# Patient Record
Sex: FEMALE | Race: BLACK OR AFRICAN AMERICAN | NOT HISPANIC OR LATINO | URBAN - METROPOLITAN AREA
[De-identification: names, ages, dates, MRNs, and addresses within clinical notes are randomized per-mention and may not be internally consistent; named-entity substitution may affect disease eponyms.]

---

## 2019-08-20 ENCOUNTER — EMERGENCY (EMERGENCY)
Facility: HOSPITAL | Age: 44
LOS: 1 days | Discharge: HOME | End: 2019-08-20
Attending: EMERGENCY MEDICINE | Admitting: EMERGENCY MEDICINE
Payer: SELF-PAY

## 2019-08-20 VITALS
DIASTOLIC BLOOD PRESSURE: 90 MMHG | TEMPERATURE: 98 F | SYSTOLIC BLOOD PRESSURE: 152 MMHG | OXYGEN SATURATION: 100 % | RESPIRATION RATE: 18 BRPM | HEART RATE: 100 BPM

## 2019-08-20 VITALS — DIASTOLIC BLOOD PRESSURE: 85 MMHG | SYSTOLIC BLOOD PRESSURE: 157 MMHG | HEART RATE: 89 BPM | TEMPERATURE: 98 F

## 2019-08-20 DIAGNOSIS — F10.920 ALCOHOL USE, UNSPECIFIED WITH INTOXICATION, UNCOMPLICATED: ICD-10-CM

## 2019-08-20 DIAGNOSIS — F43.20 ADJUSTMENT DISORDER, UNSPECIFIED: ICD-10-CM

## 2019-08-20 LAB
ALBUMIN SERPL ELPH-MCNC: 4.6 G/DL — SIGNIFICANT CHANGE UP (ref 3.3–5)
ALP SERPL-CCNC: 163 U/L — HIGH (ref 40–120)
ALT FLD-CCNC: 20 U/L — SIGNIFICANT CHANGE UP (ref 4–33)
AMPHET UR-MCNC: NEGATIVE — SIGNIFICANT CHANGE UP
ANION GAP SERPL CALC-SCNC: 20 MMO/L — HIGH (ref 7–14)
APAP SERPL-MCNC: < 15 UG/ML — LOW (ref 15–25)
APPEARANCE UR: CLEAR — SIGNIFICANT CHANGE UP
AST SERPL-CCNC: 17 U/L — SIGNIFICANT CHANGE UP (ref 4–32)
B-OH-BUTYR SERPL-SCNC: 0.3 MMOL/L — SIGNIFICANT CHANGE UP (ref 0–0.4)
BACTERIA # UR AUTO: NEGATIVE — SIGNIFICANT CHANGE UP
BARBITURATES UR SCN-MCNC: NEGATIVE — SIGNIFICANT CHANGE UP
BASE EXCESS BLDV CALC-SCNC: -1.8 MMOL/L — SIGNIFICANT CHANGE UP
BASE EXCESS BLDV CALC-SCNC: -2.7 MMOL/L — SIGNIFICANT CHANGE UP
BASE EXCESS BLDV CALC-SCNC: -3.9 MMOL/L — SIGNIFICANT CHANGE UP
BASE EXCESS BLDV CALC-SCNC: -6.7 MMOL/L — SIGNIFICANT CHANGE UP
BASOPHILS # BLD AUTO: 0.01 K/UL — SIGNIFICANT CHANGE UP (ref 0–0.2)
BASOPHILS NFR BLD AUTO: 0.2 % — SIGNIFICANT CHANGE UP (ref 0–2)
BENZODIAZ UR-MCNC: NEGATIVE — SIGNIFICANT CHANGE UP
BILIRUB SERPL-MCNC: < 0.2 MG/DL — LOW (ref 0.2–1.2)
BILIRUB UR-MCNC: NEGATIVE — SIGNIFICANT CHANGE UP
BLOOD GAS VENOUS - CREATININE: 0.48 MG/DL — LOW (ref 0.5–1.3)
BLOOD GAS VENOUS - CREATININE: 0.55 MG/DL — SIGNIFICANT CHANGE UP (ref 0.5–1.3)
BLOOD GAS VENOUS - CREATININE: 0.65 MG/DL — SIGNIFICANT CHANGE UP (ref 0.5–1.3)
BLOOD GAS VENOUS - CREATININE: SIGNIFICANT CHANGE UP MG/DL (ref 0.5–1.3)
BLOOD GAS VENOUS - FIO2: 21 — SIGNIFICANT CHANGE UP
BLOOD UR QL VISUAL: SIGNIFICANT CHANGE UP
BUN SERPL-MCNC: 16 MG/DL — SIGNIFICANT CHANGE UP (ref 7–23)
CALCIUM SERPL-MCNC: 9.5 MG/DL — SIGNIFICANT CHANGE UP (ref 8.4–10.5)
CANNABINOIDS UR-MCNC: NEGATIVE — SIGNIFICANT CHANGE UP
CHLORIDE BLDV-SCNC: 107 MMOL/L — SIGNIFICANT CHANGE UP (ref 96–108)
CHLORIDE BLDV-SCNC: 111 MMOL/L — HIGH (ref 96–108)
CHLORIDE BLDV-SCNC: 113 MMOL/L — HIGH (ref 96–108)
CHLORIDE BLDV-SCNC: 99 MMOL/L — SIGNIFICANT CHANGE UP (ref 96–108)
CHLORIDE SERPL-SCNC: 93 MMOL/L — LOW (ref 98–107)
CO2 SERPL-SCNC: 20 MMOL/L — LOW (ref 22–31)
COCAINE METAB.OTHER UR-MCNC: NEGATIVE — SIGNIFICANT CHANGE UP
COLOR SPEC: COLORLESS — SIGNIFICANT CHANGE UP
CREAT SERPL-MCNC: 0.56 MG/DL — SIGNIFICANT CHANGE UP (ref 0.5–1.3)
EOSINOPHIL # BLD AUTO: 0.06 K/UL — SIGNIFICANT CHANGE UP (ref 0–0.5)
EOSINOPHIL NFR BLD AUTO: 1.1 % — SIGNIFICANT CHANGE UP (ref 0–6)
ETHANOL BLD-MCNC: 147 MG/DL — HIGH
GAS PNL BLDV: 133 MMOL/L — LOW (ref 136–146)
GAS PNL BLDV: 134 MMOL/L — LOW (ref 136–146)
GAS PNL BLDV: 134 MMOL/L — LOW (ref 136–146)
GAS PNL BLDV: 136 MMOL/L — SIGNIFICANT CHANGE UP (ref 136–146)
GLUCOSE BLDV-MCNC: 132 MG/DL — HIGH (ref 70–99)
GLUCOSE BLDV-MCNC: 146 MG/DL — HIGH (ref 70–99)
GLUCOSE BLDV-MCNC: 275 MG/DL — HIGH (ref 70–99)
GLUCOSE BLDV-MCNC: 487 MG/DL — CRITICAL HIGH (ref 70–99)
GLUCOSE SERPL-MCNC: 520 MG/DL — CRITICAL HIGH (ref 70–99)
GLUCOSE UR-MCNC: >1000 — HIGH
HCG SERPL-ACNC: < 5 MIU/ML — SIGNIFICANT CHANGE UP
HCO3 BLDV-SCNC: 19 MMOL/L — LOW (ref 20–27)
HCO3 BLDV-SCNC: 21 MMOL/L — SIGNIFICANT CHANGE UP (ref 20–27)
HCO3 BLDV-SCNC: 22 MMOL/L — SIGNIFICANT CHANGE UP (ref 20–27)
HCO3 BLDV-SCNC: 23 MMOL/L — SIGNIFICANT CHANGE UP (ref 20–27)
HCT VFR BLD CALC: 41.5 % — SIGNIFICANT CHANGE UP (ref 34.5–45)
HCT VFR BLDV CALC: 32.3 % — LOW (ref 34.5–45)
HCT VFR BLDV CALC: 33.1 % — LOW (ref 34.5–45)
HCT VFR BLDV CALC: 34.1 % — LOW (ref 34.5–45)
HCT VFR BLDV CALC: 40.1 % — SIGNIFICANT CHANGE UP (ref 34.5–45)
HGB BLD-MCNC: 12.7 G/DL — SIGNIFICANT CHANGE UP (ref 11.5–15.5)
HGB BLDV-MCNC: 10.5 G/DL — LOW (ref 11.5–15.5)
HGB BLDV-MCNC: 10.7 G/DL — LOW (ref 11.5–15.5)
HGB BLDV-MCNC: 11 G/DL — LOW (ref 11.5–15.5)
HGB BLDV-MCNC: 13 G/DL — SIGNIFICANT CHANGE UP (ref 11.5–15.5)
HYALINE CASTS # UR AUTO: NEGATIVE — SIGNIFICANT CHANGE UP
IMM GRANULOCYTES NFR BLD AUTO: 0.2 % — SIGNIFICANT CHANGE UP (ref 0–1.5)
KETONES UR-MCNC: SIGNIFICANT CHANGE UP
LACTATE BLDV-MCNC: 1.5 MMOL/L — SIGNIFICANT CHANGE UP (ref 0.5–2)
LACTATE BLDV-MCNC: 2.6 MMOL/L — HIGH (ref 0.5–2)
LACTATE BLDV-MCNC: 4 MMOL/L — CRITICAL HIGH (ref 0.5–2)
LACTATE BLDV-MCNC: 4.2 MMOL/L — CRITICAL HIGH (ref 0.5–2)
LEUKOCYTE ESTERASE UR-ACNC: NEGATIVE — SIGNIFICANT CHANGE UP
LYMPHOCYTES # BLD AUTO: 2.49 K/UL — SIGNIFICANT CHANGE UP (ref 1–3.3)
LYMPHOCYTES # BLD AUTO: 44.9 % — HIGH (ref 13–44)
MCHC RBC-ENTMCNC: 27.7 PG — SIGNIFICANT CHANGE UP (ref 27–34)
MCHC RBC-ENTMCNC: 30.6 % — LOW (ref 32–36)
MCV RBC AUTO: 90.6 FL — SIGNIFICANT CHANGE UP (ref 80–100)
METHADONE UR-MCNC: NEGATIVE — SIGNIFICANT CHANGE UP
MONOCYTES # BLD AUTO: 0.3 K/UL — SIGNIFICANT CHANGE UP (ref 0–0.9)
MONOCYTES NFR BLD AUTO: 5.4 % — SIGNIFICANT CHANGE UP (ref 2–14)
NEUTROPHILS # BLD AUTO: 2.67 K/UL — SIGNIFICANT CHANGE UP (ref 1.8–7.4)
NEUTROPHILS NFR BLD AUTO: 48.2 % — SIGNIFICANT CHANGE UP (ref 43–77)
NITRITE UR-MCNC: NEGATIVE — SIGNIFICANT CHANGE UP
NRBC # FLD: 0 K/UL — SIGNIFICANT CHANGE UP (ref 0–0)
OPIATES UR-MCNC: NEGATIVE — SIGNIFICANT CHANGE UP
OXYCODONE UR-MCNC: NEGATIVE — SIGNIFICANT CHANGE UP
PCO2 BLDV: 37 MMHG — LOW (ref 41–51)
PCO2 BLDV: 38 MMHG — LOW (ref 41–51)
PCO2 BLDV: 38 MMHG — LOW (ref 41–51)
PCO2 BLDV: 45 MMHG — SIGNIFICANT CHANGE UP (ref 41–51)
PCP UR-MCNC: NEGATIVE — SIGNIFICANT CHANGE UP
PH BLDV: 7.32 PH — SIGNIFICANT CHANGE UP (ref 7.32–7.43)
PH BLDV: 7.32 PH — SIGNIFICANT CHANGE UP (ref 7.32–7.43)
PH BLDV: 7.36 PH — SIGNIFICANT CHANGE UP (ref 7.32–7.43)
PH BLDV: 7.39 PH — SIGNIFICANT CHANGE UP (ref 7.32–7.43)
PH UR: 6 — SIGNIFICANT CHANGE UP (ref 5–8)
PLATELET # BLD AUTO: 256 K/UL — SIGNIFICANT CHANGE UP (ref 150–400)
PMV BLD: 12.1 FL — SIGNIFICANT CHANGE UP (ref 7–13)
PO2 BLDV: 147 MMHG — HIGH (ref 35–40)
PO2 BLDV: 54 MMHG — HIGH (ref 35–40)
PO2 BLDV: 61 MMHG — HIGH (ref 35–40)
PO2 BLDV: 81 MMHG — HIGH (ref 35–40)
POTASSIUM BLDV-SCNC: 3.6 MMOL/L — SIGNIFICANT CHANGE UP (ref 3.4–4.5)
POTASSIUM BLDV-SCNC: 3.6 MMOL/L — SIGNIFICANT CHANGE UP (ref 3.4–4.5)
POTASSIUM BLDV-SCNC: 4 MMOL/L — SIGNIFICANT CHANGE UP (ref 3.4–4.5)
POTASSIUM BLDV-SCNC: 4.6 MMOL/L — HIGH (ref 3.4–4.5)
POTASSIUM SERPL-MCNC: 4 MMOL/L — SIGNIFICANT CHANGE UP (ref 3.5–5.3)
POTASSIUM SERPL-SCNC: 4 MMOL/L — SIGNIFICANT CHANGE UP (ref 3.5–5.3)
PROT SERPL-MCNC: 7.8 G/DL — SIGNIFICANT CHANGE UP (ref 6–8.3)
PROT UR-MCNC: NEGATIVE — SIGNIFICANT CHANGE UP
RBC # BLD: 4.58 M/UL — SIGNIFICANT CHANGE UP (ref 3.8–5.2)
RBC # FLD: 12.7 % — SIGNIFICANT CHANGE UP (ref 10.3–14.5)
RBC CASTS # UR COMP ASSIST: HIGH (ref 0–?)
SALICYLATES SERPL-MCNC: < 5 MG/DL — LOW (ref 15–30)
SAO2 % BLDV: 80.8 % — SIGNIFICANT CHANGE UP (ref 60–85)
SAO2 % BLDV: 92.1 % — HIGH (ref 60–85)
SAO2 % BLDV: 94.9 % — HIGH (ref 60–85)
SAO2 % BLDV: 99 % — HIGH (ref 60–85)
SODIUM SERPL-SCNC: 133 MMOL/L — LOW (ref 135–145)
SP GR SPEC: 1.03 — SIGNIFICANT CHANGE UP (ref 1–1.04)
SQUAMOUS # UR AUTO: SIGNIFICANT CHANGE UP
UROBILINOGEN FLD QL: NORMAL — SIGNIFICANT CHANGE UP
WBC # BLD: 5.54 K/UL — SIGNIFICANT CHANGE UP (ref 3.8–10.5)
WBC # FLD AUTO: 5.54 K/UL — SIGNIFICANT CHANGE UP (ref 3.8–10.5)
WBC UR QL: SIGNIFICANT CHANGE UP (ref 0–?)

## 2019-08-20 PROCEDURE — 99053 MED SERV 10PM-8AM 24 HR FAC: CPT

## 2019-08-20 PROCEDURE — 90792 PSYCH DIAG EVAL W/MED SRVCS: CPT

## 2019-08-20 PROCEDURE — 93010 ELECTROCARDIOGRAM REPORT: CPT

## 2019-08-20 PROCEDURE — 99285 EMERGENCY DEPT VISIT HI MDM: CPT | Mod: 25

## 2019-08-20 RX ORDER — LORATADINE 10 MG/1
10 TABLET ORAL DAILY
Refills: 0 | Status: DISCONTINUED | OUTPATIENT
Start: 2019-08-20 | End: 2019-08-31

## 2019-08-20 RX ORDER — SODIUM CHLORIDE 9 MG/ML
1000 INJECTION INTRAMUSCULAR; INTRAVENOUS; SUBCUTANEOUS ONCE
Refills: 0 | Status: COMPLETED | OUTPATIENT
Start: 2019-08-20 | End: 2019-08-20

## 2019-08-20 RX ORDER — SODIUM CHLORIDE 9 MG/ML
2000 INJECTION INTRAMUSCULAR; INTRAVENOUS; SUBCUTANEOUS ONCE
Refills: 0 | Status: COMPLETED | OUTPATIENT
Start: 2019-08-20 | End: 2019-08-20

## 2019-08-20 RX ORDER — LORATADINE 10 MG/1
10 TABLET ORAL ONCE
Refills: 0 | Status: DISCONTINUED | OUTPATIENT
Start: 2019-08-20 | End: 2019-08-20

## 2019-08-20 RX ORDER — ACETAMINOPHEN 500 MG
650 TABLET ORAL ONCE
Refills: 0 | Status: COMPLETED | OUTPATIENT
Start: 2019-08-20 | End: 2019-08-20

## 2019-08-20 RX ORDER — ACETAMINOPHEN 500 MG
975 TABLET ORAL ONCE
Refills: 0 | Status: COMPLETED | OUTPATIENT
Start: 2019-08-20 | End: 2019-08-20

## 2019-08-20 RX ORDER — INSULIN LISPRO 100/ML
10 VIAL (ML) SUBCUTANEOUS ONCE
Refills: 0 | Status: COMPLETED | OUTPATIENT
Start: 2019-08-20 | End: 2019-08-20

## 2019-08-20 RX ADMIN — Medication 975 MILLIGRAM(S): at 16:36

## 2019-08-20 RX ADMIN — Medication 10 UNIT(S): at 08:50

## 2019-08-20 RX ADMIN — Medication 650 MILLIGRAM(S): at 11:15

## 2019-08-20 RX ADMIN — LORATADINE 10 MILLIGRAM(S): 10 TABLET ORAL at 13:26

## 2019-08-20 RX ADMIN — SODIUM CHLORIDE 2000 MILLILITER(S): 9 INJECTION INTRAMUSCULAR; INTRAVENOUS; SUBCUTANEOUS at 08:50

## 2019-08-20 RX ADMIN — SODIUM CHLORIDE 1000 MILLILITER(S): 9 INJECTION INTRAMUSCULAR; INTRAVENOUS; SUBCUTANEOUS at 11:14

## 2019-08-20 RX ADMIN — Medication 650 MILLIGRAM(S): at 08:59

## 2019-08-20 NOTE — ED ADULT NURSE NOTE - NSIMPLEMENTINTERV_GEN_ALL_ED
Implemented All Universal Safety Interventions:  [place] to call system. Call bell, personal items and telephone within reach. Instruct patient to call for assistance. Room bathroom lighting operational. Non-slip footwear when patient is off stretcher. Physically safe environment: no spills, clutter or unnecessary equipment. Stretcher in lowest position, wheels locked, appropriate side rails in place.

## 2019-08-20 NOTE — ED BEHAVIORAL HEALTH ASSESSMENT NOTE - DESCRIPTION
single, previously ; does hair at times for work; resides with daughter and dtr-in-law diabetes Vital Signs Last 24 Hrs  T(C): 36.7 (20 Aug 2019 17:19), Max: 36.9 (20 Aug 2019 15:21)  T(F): 98 (20 Aug 2019 17:19), Max: 98.4 (20 Aug 2019 15:21)  HR: 89 (20 Aug 2019 17:19) (89 - 100)  BP: 157/85 (20 Aug 2019 17:19) (124/68 - 157/85)  BP(mean): --  RR: 17 (20 Aug 2019 15:21) (17 - 18)  SpO2: 100% (20 Aug 2019 15:21) (100% - 100%)
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
## 2019-08-20 NOTE — ED PROVIDER NOTE - PROGRESS NOTE DETAILS
[place] PGY2- psych called, will see once pt is medically clear,   2nd lactate still high, will continue hydration, no infectious sx, UA neg, no signs of DKA  BG better, low 200s Addi PGY2- daughter, lives with pt, contact at  if needed [place] PGY2- lactate normalized after hydration, food, last BG stable, psyche repaged  medically clear [place] PGY2- cleared by psych, stable for d/c, psych crisis info given results d/w patient and copies given. counseled on importance of adhering to dm regimen and risks to her health if she does not do so. Instructed to fu psych and return precautions discussed. patient ao3, cooperative, demonstrates understanding of these instructions.

## 2019-08-20 NOTE — ED PROVIDER NOTE - PHYSICAL EXAMINATION
GEN - NAD; well appearing; A+O x3   HEAD - NC/AT   EYES- PERRL, EOMI  ENT: Airway patent, mmm, Oral cavity and pharynx normal.   NECK: Neck supple  PULMONARY - CTA b/l, symmetric breath sounds.   CARDIAC -s1s2, RRR, no M,G,R  ABDOMEN - +BS, ND, NT, soft, no guarding, no rebound, no masses   BACK - no CVA tenderness, Normal  spine   EXTREMITIES - FROM, no edema   SKIN - no rash or bruising   NEUROLOGIC - alert, speech clear, no focal deficits  PSYCH -nl mood/affect, nl insight. GEN - NAD; well appearing; A+O x3   HEAD - NC/AT   EYES- PERRL, EOMI  ENT: Airway patent, mmm, Oral cavity and pharynx normal.   NECK: Neck supple  PULMONARY - CTA b/l, symmetric breath sounds.   CARDIAC -s1s2, RRR, no M,G,R  ABDOMEN - +BS, ND, NT, soft, no guarding, no rebound, no masses   BACK - no CVA tenderness, Normal  spine   EXTREMITIES - FROM, no edema   SKIN - no rash or bruising   NEUROLOGIC - alert, speech clear, no focal deficits  PSYCH -cooperative, appears upset
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
## 2019-08-20 NOTE — ED BEHAVIORAL HEALTH ASSESSMENT NOTE - SUMMARY
Pt is a 43yo single female, with no prior psychiatric history, who made SI/HI statement earlier this am at home during argument with adult dtr. Pt was under the influence of alcohol at the time. She reports that she did not mean the statements, denies any intent/plan to harm self or others, has no h/o violence, and feels she made remarks out of anger. Pt is calm, feels safe going home, has no further issues with her dtr. Dtr also feels comfortable with pt returning home and also feels statements were made b/c the pt was under the influence of alcohol. Pt does not require inpt hospitalization for statements made out of anger and under the influence of alcohol. Currently sober, pt denies any Si/HI, or psychiatric sxs. She is cleared and can f/u outpt if she desires. Incident appears to be one time, not ongoing or chronic.

## 2019-08-20 NOTE — ED PROVIDER NOTE - NS ED ROS FT

## 2019-08-20 NOTE — ED PROVIDER NOTE - NSFOLLOWUPINSTRUCTIONS_ED_ALL_ED_FT
Please follow up with a psychiatrist.     Attached is a list of contacts.    Do not hesitate to call 911 or return to ER if you feel you need help.    Take all your medications are prescribed and try not to drink.

## 2019-08-20 NOTE — ED BEHAVIORAL HEALTH ASSESSMENT NOTE - RISK ASSESSMENT
Low risk of harm to self/others as pt has no prior psychiatric history, no h/o violence, self harm or SI; no amira/depressive/psychotic sxs impairing behavior or insight; pts risk factor is that she may drink to intoxication and have impulse control issues during those times.   Safety factors are pt's family, linear thinking, and work.  C-SSRS Screener     1. Have you ever wished to be dead or wished you could go to sleep and not wake up?  [  ]Yes, [ x ]No, [  ]Unable to Assess  Details _____________________________     2. Have you actually had any thoughts of killing yourself?   [  ]Yes, [ x ]No, [  ]Unable to Assess  Details _____________________________     If answer is “No” for 1 and 2, stop here.     Additional Suicide Risk Factors (select all that apply)  [  ]Access to lethal means including firearms  [  ]Family history of suicide  [  ]Impulsivity  [  ] Current or past mood disorder  [  ] Current or past psychotic disorder  [  ] Current or past PTSD  [  ] Current or past ADHD  [  ] Current or past TBI  [  ] Current or past cluster B personality disorder or traits  [  ] Current or past conduct problems  [  ] Recent onset of current or past psychiatric disorder  [  ] Family history of psychiatric diagnoses requiring hospitalization     Additional Activating Events (select all that apply)  [  ]Perceived burden on family or others  [  ]Current sexual or physical abuse  [  x]Substance intoxication or withdrawal  [  ]Inadequate social supports  [  ]Hopeless about or dissatisfied with current provider or treatment     Additional Protective Factors (select all that apply)  [  ] Future plans  [  ] Voodoo beliefs  [  ] Beloved pets
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
## 2019-08-20 NOTE — ED BEHAVIORAL HEALTH ASSESSMENT NOTE - SAFETY PLAN DETAILS
return to ED, call 911 if sxs recur; pt at low risk, no prior incidents; had argument w/dtr which escalated
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
## 2019-08-20 NOTE — ED PROVIDER NOTE - SEVERE SEPSIS ALERT DETAILS
lactate likely elevated 2/2 dehydration in setting of hyperglycemia, no infectious symptoms, h/p not consistent with sepsis
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
## 2024-04-24 ENCOUNTER — EMERGENCY (EMERGENCY)
Facility: HOSPITAL | Age: 49
LOS: 0 days | Discharge: HOME | End: 2024-04-24
Attending: STUDENT IN AN ORGANIZED HEALTH CARE EDUCATION/TRAINING PROGRAM
Payer: COMMERCIAL

## 2024-04-24 VITALS
WEIGHT: 173.94 LBS | HEART RATE: 138 BPM | HEIGHT: 62 IN | RESPIRATION RATE: 17 BRPM | OXYGEN SATURATION: 100 % | DIASTOLIC BLOOD PRESSURE: 100 MMHG | SYSTOLIC BLOOD PRESSURE: 135 MMHG | TEMPERATURE: 98 F

## 2024-04-24 VITALS
DIASTOLIC BLOOD PRESSURE: 71 MMHG | RESPIRATION RATE: 12 BRPM | HEART RATE: 90 BPM | OXYGEN SATURATION: 100 % | SYSTOLIC BLOOD PRESSURE: 147 MMHG | TEMPERATURE: 98 F

## 2024-04-24 DIAGNOSIS — I48.91 UNSPECIFIED ATRIAL FIBRILLATION: ICD-10-CM

## 2024-04-24 DIAGNOSIS — R00.2 PALPITATIONS: ICD-10-CM

## 2024-04-24 DIAGNOSIS — Z79.4 LONG TERM (CURRENT) USE OF INSULIN: ICD-10-CM

## 2024-04-24 DIAGNOSIS — E11.65 TYPE 2 DIABETES MELLITUS WITH HYPERGLYCEMIA: ICD-10-CM

## 2024-04-24 DIAGNOSIS — R00.0 TACHYCARDIA, UNSPECIFIED: ICD-10-CM

## 2024-04-24 PROBLEM — E11.9 TYPE 2 DIABETES MELLITUS WITHOUT COMPLICATIONS: Chronic | Status: ACTIVE | Noted: 2019-08-20

## 2024-04-24 PROBLEM — O00.90 UNSPECIFIED ECTOPIC PREGNANCY WITHOUT INTRAUTERINE PREGNANCY: Chronic | Status: ACTIVE | Noted: 2019-08-20

## 2024-04-24 LAB
ALBUMIN SERPL ELPH-MCNC: 3.3 G/DL — SIGNIFICANT CHANGE UP (ref 3.3–5)
ALP SERPL-CCNC: 262 U/L — HIGH (ref 40–120)
ALT FLD-CCNC: 35 U/L — SIGNIFICANT CHANGE UP (ref 12–78)
ANION GAP SERPL CALC-SCNC: 13 MMOL/L — SIGNIFICANT CHANGE UP (ref 5–17)
AST SERPL-CCNC: 24 U/L — SIGNIFICANT CHANGE UP (ref 15–37)
BASOPHILS # BLD AUTO: 0.02 K/UL — SIGNIFICANT CHANGE UP (ref 0–0.2)
BASOPHILS NFR BLD AUTO: 0.5 % — SIGNIFICANT CHANGE UP (ref 0–2)
BILIRUB SERPL-MCNC: 0.2 MG/DL — SIGNIFICANT CHANGE UP (ref 0.2–1.2)
BUN SERPL-MCNC: 25 MG/DL — HIGH (ref 7–23)
CALCIUM SERPL-MCNC: 9.1 MG/DL — SIGNIFICANT CHANGE UP (ref 8.5–10.1)
CHLORIDE SERPL-SCNC: 98 MMOL/L — SIGNIFICANT CHANGE UP (ref 96–108)
CO2 SERPL-SCNC: 23 MMOL/L — SIGNIFICANT CHANGE UP (ref 22–31)
CREAT SERPL-MCNC: 1.3 MG/DL — SIGNIFICANT CHANGE UP (ref 0.5–1.3)
EGFR: 50 ML/MIN/1.73M2 — LOW
EOSINOPHIL # BLD AUTO: 0.02 K/UL — SIGNIFICANT CHANGE UP (ref 0–0.5)
EOSINOPHIL NFR BLD AUTO: 0.5 % — SIGNIFICANT CHANGE UP (ref 0–6)
GLUCOSE SERPL-MCNC: 583 MG/DL — CRITICAL HIGH (ref 70–99)
HCG SERPL-ACNC: 8 MIU/ML — SIGNIFICANT CHANGE UP
HCT VFR BLD CALC: 36.9 % — SIGNIFICANT CHANGE UP (ref 34.5–45)
HGB BLD-MCNC: 11.8 G/DL — SIGNIFICANT CHANGE UP (ref 11.5–15.5)
IMM GRANULOCYTES NFR BLD AUTO: 0.2 % — SIGNIFICANT CHANGE UP (ref 0–0.9)
LYMPHOCYTES # BLD AUTO: 2.1 K/UL — SIGNIFICANT CHANGE UP (ref 1–3.3)
LYMPHOCYTES # BLD AUTO: 49 % — HIGH (ref 13–44)
MAGNESIUM SERPL-MCNC: 2.1 MG/DL — SIGNIFICANT CHANGE UP (ref 1.6–2.6)
MCHC RBC-ENTMCNC: 27.6 PG — SIGNIFICANT CHANGE UP (ref 27–34)
MCHC RBC-ENTMCNC: 32 G/DL — SIGNIFICANT CHANGE UP (ref 32–36)
MCV RBC AUTO: 86.4 FL — SIGNIFICANT CHANGE UP (ref 80–100)
MONOCYTES # BLD AUTO: 0.22 K/UL — SIGNIFICANT CHANGE UP (ref 0–0.9)
MONOCYTES NFR BLD AUTO: 5.1 % — SIGNIFICANT CHANGE UP (ref 2–14)
NEUTROPHILS # BLD AUTO: 1.92 K/UL — SIGNIFICANT CHANGE UP (ref 1.8–7.4)
NEUTROPHILS NFR BLD AUTO: 44.7 % — SIGNIFICANT CHANGE UP (ref 43–77)
NRBC # BLD: 0 /100 WBCS — SIGNIFICANT CHANGE UP (ref 0–0)
PHOSPHATE SERPL-MCNC: 3.2 MG/DL — SIGNIFICANT CHANGE UP (ref 2.5–4.5)
PLATELET # BLD AUTO: 208 K/UL — SIGNIFICANT CHANGE UP (ref 150–400)
POTASSIUM SERPL-MCNC: 4.6 MMOL/L — SIGNIFICANT CHANGE UP (ref 3.5–5.3)
POTASSIUM SERPL-SCNC: 4.6 MMOL/L — SIGNIFICANT CHANGE UP (ref 3.5–5.3)
PROT SERPL-MCNC: 7.7 GM/DL — SIGNIFICANT CHANGE UP (ref 6–8.3)
RBC # BLD: 4.27 M/UL — SIGNIFICANT CHANGE UP (ref 3.8–5.2)
RBC # FLD: 13.3 % — SIGNIFICANT CHANGE UP (ref 10.3–14.5)
SODIUM SERPL-SCNC: 134 MMOL/L — LOW (ref 135–145)
TROPONIN I, HIGH SENSITIVITY RESULT: 20.3 NG/L — SIGNIFICANT CHANGE UP
WBC # BLD: 4.29 K/UL — SIGNIFICANT CHANGE UP (ref 3.8–10.5)
WBC # FLD AUTO: 4.29 K/UL — SIGNIFICANT CHANGE UP (ref 3.8–10.5)

## 2024-04-24 PROCEDURE — 93010 ELECTROCARDIOGRAM REPORT: CPT

## 2024-04-24 PROCEDURE — 99285 EMERGENCY DEPT VISIT HI MDM: CPT

## 2024-04-24 RX ORDER — INSULIN HUMAN 100 [IU]/ML
10 INJECTION, SOLUTION SUBCUTANEOUS ONCE
Refills: 0 | Status: COMPLETED | OUTPATIENT
Start: 2024-04-24 | End: 2024-04-24

## 2024-04-24 RX ORDER — SODIUM CHLORIDE 9 MG/ML
1000 INJECTION INTRAMUSCULAR; INTRAVENOUS; SUBCUTANEOUS ONCE
Refills: 0 | Status: COMPLETED | OUTPATIENT
Start: 2024-04-24 | End: 2024-04-24

## 2024-04-24 RX ORDER — ASPIRIN/CALCIUM CARB/MAGNESIUM 324 MG
1 TABLET ORAL
Qty: 15 | Refills: 0
Start: 2024-04-24 | End: 2024-05-08

## 2024-04-24 RX ADMIN — INSULIN HUMAN 10 UNIT(S): 100 INJECTION, SOLUTION SUBCUTANEOUS at 04:34

## 2024-04-24 RX ADMIN — SODIUM CHLORIDE 1000 MILLILITER(S): 9 INJECTION INTRAMUSCULAR; INTRAVENOUS; SUBCUTANEOUS at 03:49

## 2024-04-24 RX ADMIN — SODIUM CHLORIDE 2000 MILLILITER(S): 9 INJECTION INTRAMUSCULAR; INTRAVENOUS; SUBCUTANEOUS at 03:49

## 2024-04-24 NOTE — ED PROVIDER NOTE - PATIENT PORTAL LINK FT
You can access the FollowMyHealth Patient Portal offered by [facility] by registering at the following website: [email or web address]. By joining [facility]’s FollowMyHealth portal, you will also be able to view your health information using other applications (apps) compatible with our system.

## 2024-04-24 NOTE — ED ADULT NURSE NOTE - CHIEF COMPLAINT QUOTE
BIBA " my sugar is high" c/o palpitations taking Levemir , insulin was just changed to  Lantus and Victoza but hasn't start taking it yet,  fs 536 normal saline 250ml given by EMS , never ICU, denies pain.

## 2024-04-24 NOTE — ED ADULT NURSE NOTE - OBJECTIVE STATEMENT
pt a&ox4, ambulatory with steady gait. pt reports of palpitations that started at 1am this morning. pt reports lightheadedness, dizziness, blurry vision, and SOB. pt presents tachycardic on arrival on monitor. EMS IV R AC intact. Reports insulin was taking earlier in the day, has a new Rx for Lantus but has not picked it up yet. Pt denies NVD, chest pain. PMH - DM, HTN. Denies PSH.

## 2024-04-24 NOTE — ED PROVIDER NOTE - NSFOLLOWUPINSTRUCTIONS_ED_ALL_ED_FT
Hyperglycemia    Hyperglycemia occurs when the glucose (sugar) level in your blood is too high. Symptoms include increased urination, increased thirst, a dry mouth, or changes in appetite. If started on a medication, take exactly as prescribed by your health care professional. Maintain a healthy lifestyle and follow up with your primary care physician.    SEEK IMMEDIATE MEDICAL CARE IF YOU HAVE ANY OF THE FOLLOWING SYMPTOMS: shortness of breath, change in mental status, nausea or vomiting, fruity smell to your breath, or any signs of dehydration.     Atrial Fibrillation    Atrial fibrillation is a type of irregular heartbeat (arrhythmia) where the heart quivers continuously in a chaotic pattern that makes the heart unable to pump blood normally. This can increase the risk for stroke, heart failure, and other heart-related conditions. Atrial fibrillation can be caused by a variety of conditions and may be temporary, intermittent, or permanent. Symptoms include feeling that your heart is beating rapidly or irregularly, chest discomfort, shortness of breath, or dizziness/lightheadedness that may be worse with exertion. Treatment is varied but may involve medication or electrical shock (cardioversion).    SEEK IMMEDIATE MEDICAL CARE IF YOU HAVE ANY OF THE FOLLOWING SYMPTOMS: chest pain, shortness of breath, abdominal pain, sweating, vomiting, blood in vomit/bowel movements/urine, dizziness/lightheadedness, weakness or numbness to face/arm/leg, trouble speaking or understanding, facial droop.

## 2024-04-24 NOTE — ED ADULT TRIAGE NOTE - CHIEF COMPLAINT QUOTE
BIBA " my sugar is high" c/o palpitations taking Levemir , insulin was just changed to  Lantus ,   Victoza but have not staring taking it yet fs by 536 normal saline 250ml given by EMS , never ICU, denies pain. BIBA " my sugar is high" c/o palpitations taking Levemir , insulin was just changed to  Lantus and Victoza but hasn't start taking it yet,  fs 536 normal saline 250ml given by EMS , never ICU, denies pain.

## 2024-04-24 NOTE — ED PROVIDER NOTE - CARE PROVIDER_API CALL
Neel Real  Cardiology  [address] [place] [address]  Phone: [phone]  Fax: [phone]  Established Patient  Follow Up Time: Urgent

## 2024-04-24 NOTE — ED ADULT NURSE NOTE - NSICDXPASTMEDICALHX_GEN_ALL_CORE_FT
PAST MEDICAL HISTORY:  Diabetes     Diabetes mellitus     DM (diabetes mellitus)     Ectopic pregnancy     No pertinent past medical history

## 2024-04-24 NOTE — ED PROVIDER NOTE - NSFOLLOWUPCLINICS_GEN_ALL_ED_FT
Cardiology at [facility]  Cardiology  [address] [place] [id number]  Phone: [phone]  Fax:     Cardiology at [facility]  Cardiology  [address] [place] [address]  Phone: [phone]  Fax:

## 2024-04-24 NOTE — ED PROVIDER NOTE - CLINICAL SUMMARY MEDICAL DECISION MAKING FREE TEXT BOX
49-year-old female with past medical history of diabetes on insulin presenting with palpitations for 1 day.  Patient states she had palpitations lasting for several minutes, palpitations have been going on intermittently.  Patient states she has had these episodes of palpitations in the past but was never evaluated for it.  Patient states she has not taken her insulin over the last 24 hours, believes this is the reason why her blood sugar is high.  Denies chest pain shortness of breath abdominal pain nausea vomiting fever cough chills headache.    Patient initially here tachycardic to 130s in A-fib with RVR, otherwise hemodynamically stable.  Fingerstick here in mid 500s.    Workup initiated to rule out DKA, given 2 L IV fluids for hyperglycemia.  No DKA on workup, A-fib resolved after 500 cc fluids.  A-fib likely secondary to hyperglycemia versus dehydration, will defer treatment of A-fib for now and have patient follow-up with cardiologist.  Patient placed in observation here for 3 hours with no repeat episodes of A-fib, resolved to sinus rhythm in 80s soon after fluids given.  Patient given 10 units of insulin, blood sugar normalizing.  Will have patient follow-up with cardiology for paroxysmal A-fib and endocrinology for blood sugar management 49-year-old female with past medical history of diabetes on insulin presenting with palpitations for 1 day.  Patient states she had palpitations lasting for several minutes, palpitations have been going on intermittently.  Patient states she has had these episodes of palpitations in the past but was never evaluated for it.  Patient states she has not taken her insulin over the last 24 hours, believes this is the reason why her blood sugar is high.  Denies chest pain shortness of breath abdominal pain nausea vomiting fever cough chills headache.    Patient initially here tachycardic to 130s in A-fib with RVR, otherwise hemodynamically stable.  Fingerstick here in mid 500s.    Workup initiated to rule out DKA, given 2 L IV fluids for hyperglycemia.  No DKA on workup, A-fib resolved after 500 cc fluids.  A-fib likely secondary to hyperglycemia versus dehydration, will defer treatment of A-fib for now and have patient follow-up with cardiologist.  Patient placed in observation here for 3 hours with no repeat episodes of A-fib, resolved to sinus rhythm in 80s soon after fluids given.  Patient given 10 units of insulin, blood sugar normalizing.  Will have patient follow-up with cardiology for paroxysmal A-fib and endocrinology for blood sugar management. will have pt take daily aspirin until she sees cardiolgist. CHADVASC score 2

## 2024-07-02 ENCOUNTER — APPOINTMENT (OUTPATIENT)
Dept: CARDIOLOGY | Facility: CLINIC | Age: 49
End: 2024-07-02

## 2024-07-02 PROBLEM — Z00.00 ENCOUNTER FOR PREVENTIVE HEALTH EXAMINATION: Status: ACTIVE | Noted: 2024-07-02

## 2024-08-12 NOTE — ED ADULT NURSE NOTE - NSSUSCREENINGQ2_ED_ALL_ED
[facility] Social Determinants Of Health (Sdoh) Screening Questionnaire    Question 8/11/2024  5:10 PM CDT - Filed by Patient   How hard is it for you to pay for the very basics like food, housing, medical care, and heating? Not hard at all   Within the past 12 months, you worried that your food would run out before you got the money to buy more. Never true   Within the past 12 months, the food you bought just didn't last and you didn't have money to get more. Never true   In the past 12 months, has lack of transportation kept you from meetings, work, or from getting things needed for daily living? No   At any time in the past 12 months, were you homeless or living in a shelter (including now)? No   Would you like resources for any of these topics? No, thank you      Yes

## 2024-10-21 ENCOUNTER — EMERGENCY (EMERGENCY)
Facility: HOSPITAL | Age: 49
LOS: 0 days | Discharge: HOME | End: 2024-10-21
Attending: STUDENT IN AN ORGANIZED HEALTH CARE EDUCATION/TRAINING PROGRAM
Payer: COMMERCIAL

## 2024-10-21 VITALS
OXYGEN SATURATION: 100 % | HEART RATE: 65 BPM | DIASTOLIC BLOOD PRESSURE: 82 MMHG | TEMPERATURE: 98 F | RESPIRATION RATE: 18 BRPM | SYSTOLIC BLOOD PRESSURE: 140 MMHG

## 2024-10-21 VITALS
WEIGHT: 173.94 LBS | SYSTOLIC BLOOD PRESSURE: 148 MMHG | RESPIRATION RATE: 18 BRPM | TEMPERATURE: 98 F | DIASTOLIC BLOOD PRESSURE: 83 MMHG | OXYGEN SATURATION: 100 % | HEART RATE: 80 BPM | HEIGHT: 62 IN

## 2024-10-21 DIAGNOSIS — R00.2 PALPITATIONS: ICD-10-CM

## 2024-10-21 DIAGNOSIS — E11.9 TYPE 2 DIABETES MELLITUS WITHOUT COMPLICATIONS: ICD-10-CM

## 2024-10-21 LAB
ALBUMIN SERPL ELPH-MCNC: 3.3 G/DL — SIGNIFICANT CHANGE UP (ref 3.3–5)
ALP SERPL-CCNC: 192 U/L — HIGH (ref 40–120)
ALT FLD-CCNC: 26 U/L — SIGNIFICANT CHANGE UP (ref 12–78)
ANION GAP SERPL CALC-SCNC: 4 MMOL/L — LOW (ref 5–17)
AST SERPL-CCNC: 16 U/L — SIGNIFICANT CHANGE UP (ref 15–37)
BASOPHILS # BLD AUTO: 0.02 K/UL — SIGNIFICANT CHANGE UP (ref 0–0.2)
BASOPHILS NFR BLD AUTO: 0.4 % — SIGNIFICANT CHANGE UP (ref 0–2)
BILIRUB SERPL-MCNC: 0.2 MG/DL — SIGNIFICANT CHANGE UP (ref 0.2–1.2)
BUN SERPL-MCNC: 27 MG/DL — HIGH (ref 7–23)
CALCIUM SERPL-MCNC: 8.9 MG/DL — SIGNIFICANT CHANGE UP (ref 8.5–10.1)
CHLORIDE SERPL-SCNC: 106 MMOL/L — SIGNIFICANT CHANGE UP (ref 96–108)
CO2 SERPL-SCNC: 29 MMOL/L — SIGNIFICANT CHANGE UP (ref 22–31)
CREAT SERPL-MCNC: 1.06 MG/DL — SIGNIFICANT CHANGE UP (ref 0.5–1.3)
EGFR: 64 ML/MIN/1.73M2 — SIGNIFICANT CHANGE UP
EOSINOPHIL # BLD AUTO: 0.11 K/UL — SIGNIFICANT CHANGE UP (ref 0–0.5)
EOSINOPHIL NFR BLD AUTO: 2.1 % — SIGNIFICANT CHANGE UP (ref 0–6)
GLUCOSE SERPL-MCNC: 236 MG/DL — HIGH (ref 70–99)
HCT VFR BLD CALC: 33.1 % — LOW (ref 34.5–45)
HGB BLD-MCNC: 10.5 G/DL — LOW (ref 11.5–15.5)
IMM GRANULOCYTES NFR BLD AUTO: 0.4 % — SIGNIFICANT CHANGE UP (ref 0–0.9)
LYMPHOCYTES # BLD AUTO: 2.51 K/UL — SIGNIFICANT CHANGE UP (ref 1–3.3)
LYMPHOCYTES # BLD AUTO: 47.2 % — HIGH (ref 13–44)
MCHC RBC-ENTMCNC: 27.9 PG — SIGNIFICANT CHANGE UP (ref 27–34)
MCHC RBC-ENTMCNC: 31.7 G/DL — LOW (ref 32–36)
MCV RBC AUTO: 87.8 FL — SIGNIFICANT CHANGE UP (ref 80–100)
MONOCYTES # BLD AUTO: 0.42 K/UL — SIGNIFICANT CHANGE UP (ref 0–0.9)
MONOCYTES NFR BLD AUTO: 7.9 % — SIGNIFICANT CHANGE UP (ref 2–14)
NEUTROPHILS # BLD AUTO: 2.24 K/UL — SIGNIFICANT CHANGE UP (ref 1.8–7.4)
NEUTROPHILS NFR BLD AUTO: 42 % — LOW (ref 43–77)
NRBC # BLD: 0 /100 WBCS — SIGNIFICANT CHANGE UP (ref 0–0)
PLATELET # BLD AUTO: 206 K/UL — SIGNIFICANT CHANGE UP (ref 150–400)
POTASSIUM SERPL-MCNC: 3.9 MMOL/L — SIGNIFICANT CHANGE UP (ref 3.5–5.3)
POTASSIUM SERPL-SCNC: 3.9 MMOL/L — SIGNIFICANT CHANGE UP (ref 3.5–5.3)
PROT SERPL-MCNC: 6.8 GM/DL — SIGNIFICANT CHANGE UP (ref 6–8.3)
RBC # BLD: 3.77 M/UL — LOW (ref 3.8–5.2)
RBC # FLD: 13.6 % — SIGNIFICANT CHANGE UP (ref 10.3–14.5)
SODIUM SERPL-SCNC: 139 MMOL/L — SIGNIFICANT CHANGE UP (ref 135–145)
TROPONIN I, HIGH SENSITIVITY RESULT: 6.1 NG/L — SIGNIFICANT CHANGE UP
WBC # BLD: 5.32 K/UL — SIGNIFICANT CHANGE UP (ref 3.8–10.5)
WBC # FLD AUTO: 5.32 K/UL — SIGNIFICANT CHANGE UP (ref 3.8–10.5)

## 2024-10-21 PROCEDURE — 93010 ELECTROCARDIOGRAM REPORT: CPT

## 2024-10-21 PROCEDURE — 99284 EMERGENCY DEPT VISIT MOD MDM: CPT

## 2024-10-21 RX ORDER — SODIUM CHLORIDE 0.9 % (FLUSH) 0.9 %
1000 SYRINGE (ML) INJECTION ONCE
Refills: 0 | Status: COMPLETED | OUTPATIENT
Start: 2024-10-21 | End: 2024-10-21

## 2024-10-21 RX ADMIN — Medication 2000 MILLILITER(S): at 02:54

## 2024-10-21 NOTE — ED PROVIDER NOTE - CLINICAL SUMMARY MEDICAL DECISION MAKING FREE TEXT BOX
49-year-old female with past medical history of diabetes presenting with palpitations 2 hours prior arrival.  Patient states she woke up in middle night with palpitations, no chest pain shortness of breath associated.  Patient states episode lasted for few minutes and then self resolved.  This resolved by the time she came to EMS.  States that she has had the symptoms in the past, states she saw cardiologist long time ago but does not remember workup.  Does not remember having Holter monitor.  Denies any anxiety.  Denies any issues with thyroid.  On exam patient hemodynamically stable heart rate in 70s, no murmurs rubs or gallops, CV exam benign.  Patient asymptomatic.  EKG benign, no events on monitor, labs negative including Trope BMP CBC.  Patient requesting discharge, states she will follow-up with her own cardiologist again to get Holter monitor

## 2024-10-21 NOTE — ED ADULT NURSE NOTE - NSFALLUNIVINTERV_ED_ALL_ED
Bed/Stretcher in lowest position, wheels locked, appropriate side rails in place/Call bell, personal items and telephone in reach/Instruct patient to call for assistance before getting out of bed/chair/stretcher/Non-slip footwear applied when patient is off stretcher/[place] to call system/Physically safe environment - no spills, clutter or unnecessary equipment/Purposeful proactive rounding/Room/bathroom lighting operational, light cord in reach

## 2024-10-21 NOTE — ED ADULT TRIAGE NOTE - CHIEF COMPLAINT QUOTE
Patient BIBA: Patient reports "I was woken up by my heart pounding out my chest." patient denies pain. Patient denies other symptoms. Patient reports it started 'around 130 am and eased off as I got here."   PMH: DM, HTN

## 2024-10-21 NOTE — ED PROVIDER NOTE - PATIENT PORTAL LINK FT
[address] [facility] OU:
Can call if OD stats bothering her for thermal cauterization OD
Continue: Fish oil: Comments: 1 z day
Continue: Refresh Celluvisc (carboxymethylcellulose sodium): dropperette,gel: 1% 1 drop four times a day into both eyes
Continue: olopatadine (olopatadine): drops: 0.1% 1 drop twice a day as needed into both eyes
Fish Oil QD PO
General:
[place], blurred vision too badly
Keep return with Dr. Jaswinder Hopkins 5/2021
Medications:
OS&gt; OD by pt complaint
[place] QD OU, if desires
Pt states both eyes feel about the same now
Pt will call if she feels like she needs to see Dr. Bonnie Martinez again
Refresh Celluvisc 4-6 times a day
Return prn
Thermal cauterization previously done in OS
WOJCIECH, OU:
You can access the FollowMyHealth Patient Portal offered by [facility] by registering at the following website: [email or web address]. By joining [facility]’s FollowMyHealth portal, you will also be able to view your health information using other applications (apps) compatible with our system.

## 2024-10-21 NOTE — ED ADULT NURSE NOTE - OBJECTIVE STATEMENT
Covering for primary RN, Jonny. Pt AAOx4. 49 year old female presents to ED with complaint of feeling as though her heart was pounding out of  her chest. Denies pain, shortness of breath, nausea, vomiting. Pt states it resolved as she got to the ED. Respirations equal and unlabored. No acute distress noted at this time. Patient placed on cardiac and SpO2 monitor at bedside.

## 2024-11-08 ENCOUNTER — EMERGENCY (EMERGENCY)
Facility: HOSPITAL | Age: 49
LOS: 0 days | Discharge: HOME | End: 2024-11-09
Attending: STUDENT IN AN ORGANIZED HEALTH CARE EDUCATION/TRAINING PROGRAM
Payer: COMMERCIAL

## 2024-11-08 VITALS
SYSTOLIC BLOOD PRESSURE: 156 MMHG | RESPIRATION RATE: 16 BRPM | TEMPERATURE: 98 F | OXYGEN SATURATION: 99 % | HEIGHT: 62 IN | HEART RATE: 75 BPM | WEIGHT: 179.9 LBS | DIASTOLIC BLOOD PRESSURE: 81 MMHG

## 2024-11-08 DIAGNOSIS — M79.605 PAIN IN LEFT LEG: ICD-10-CM

## 2024-11-08 DIAGNOSIS — M79.89 OTHER SPECIFIED SOFT TISSUE DISORDERS: ICD-10-CM

## 2024-11-08 DIAGNOSIS — M79.604 PAIN IN RIGHT LEG: ICD-10-CM

## 2024-11-08 DIAGNOSIS — E11.9 TYPE 2 DIABETES MELLITUS WITHOUT COMPLICATIONS: ICD-10-CM

## 2024-11-08 DIAGNOSIS — R60.0 LOCALIZED EDEMA: ICD-10-CM

## 2024-11-08 PROCEDURE — 93970 EXTREMITY STUDY: CPT | Mod: 26

## 2024-11-08 PROCEDURE — 99284 EMERGENCY DEPT VISIT MOD MDM: CPT

## 2024-11-08 NOTE — ED ADULT NURSE NOTE - CHIEF COMPLAINT QUOTE
Pt complains of pain to left leg x a couple days. [facility] sent her to ER to make sure it's not a blood clot.

## 2024-11-08 NOTE — ED ADULT NURSE NOTE - OBJECTIVE STATEMENT
Pt complains of pain to left leg x a couple days. [facility] sent her to ER to make sure it's not a blood clot. Pt complains of pain to left leg x a couple days. [facility] sent her to ER to make sure it's not a blood clot. No swelling noted in the leg. Ambulates with steady gait. Denies chest pain, SOB, dizziness, cough, weakness, palpitations.

## 2024-11-08 NOTE — ED PROVIDER NOTE - NSFOLLOWUPINSTRUCTIONS_ED_ALL_ED_FT
You were seen in the ED for leg swelling.    Your ultrasound did not show a blood clot.    Please follow up with your vascular team in the office.    If you have worsening swelling, pain, fever, or other acute symptoms please return to the ED.

## 2024-11-08 NOTE — ED ADULT NURSE NOTE - NSFALLUNIVINTERV_ED_ALL_ED
Bed/Stretcher in lowest position, wheels locked, appropriate side rails in place/Call bell, personal items and telephone in reach/Instruct patient to call for assistance before getting out of bed/chair/stretcher/Non-slip footwear applied when patient is off stretcher/[place] to call system/Physically safe environment - no spills, clutter or unnecessary equipment/Purposeful proactive rounding/Room/bathroom lighting operational, light cord in reach

## 2024-11-08 NOTE — ED PROVIDER NOTE - OBJECTIVE STATEMENT
50 y/o F with PMH DM presenting to the ED c/o leg pain and swelling. Patient states she had vascular procedure on her bilateral legs but over the past week has noticed increased pain and swelling. States she went to urgent care and was sent to the ED to r/o DVT. Reports she felt a "lump" on her L leg. She denies fever, chills, chest pain, or dyspnea. She reports she is homeless and staying with a friend.

## 2024-11-08 NOTE — ED PROVIDER NOTE - PATIENT PORTAL LINK FT
You can access the FollowMyHealth Patient Portal offered by [facility] by registering at the following website: [email or web address]. By joining [employer]’s FollowMyHealth portal, you will also be able to view your health information using other applications (apps) compatible with our system.

## 2024-11-08 NOTE — ED PROVIDER NOTE - CLINICAL SUMMARY MEDICAL DECISION MAKING FREE TEXT BOX
48 y/o F with PMH DM presenting to the ED c/o leg pain and swelling.   Vitals stable.  She is well appearing in NAD.  Unlikely DVT given bilateral appearance, she has good pulses in both extremities do not suspect arterial pathology  Will obtain US to r/o DVT  Likely d/c to f/u with her vascular team

## 2024-11-08 NOTE — ED ADULT TRIAGE NOTE - CHIEF COMPLAINT QUOTE
Pt complains of pain to left leg x a couple days. [facility] sent her to ER to make sure it's not a blood clot.

## 2024-11-08 NOTE — ED PROVIDER NOTE - PHYSICAL EXAMINATION
GENERAL: Awake, alert, NAD  HEENT: NC/AT, moist mucous membranes  LUNGS: CTAB, no wheezes or crackles   CARDIAC: RRR, no m/r/g  EXT: minimal bilateral lower extremity edema, no calf tenderness, 2+ DP pulses bilaterally, no deformities.  NEURO: A&Ox3. Moving all extremities.  SKIN: Warm and dry. No rash.  PSYCH: Normal affect.